# Patient Record
Sex: FEMALE | Race: WHITE | NOT HISPANIC OR LATINO | URBAN - METROPOLITAN AREA
[De-identification: names, ages, dates, MRNs, and addresses within clinical notes are randomized per-mention and may not be internally consistent; named-entity substitution may affect disease eponyms.]

---

## 2023-03-24 PROBLEM — S89.92XA LEFT KNEE INJURY: Status: ACTIVE | Noted: 2023-03-24

## 2023-03-24 PROBLEM — S83.232A: Status: ACTIVE | Noted: 2023-03-24

## 2023-03-24 PROBLEM — S83.207A TEAR OF MENISCUS OF LEFT KNEE: Status: ACTIVE | Noted: 2023-03-24

## 2023-03-24 RX ORDER — AMLODIPINE BESYLATE 10 MG/1
TABLET ORAL
COMMUNITY
Start: 2021-05-12

## 2023-03-24 RX ORDER — KETOCONAZOLE 20 MG/G
CREAM TOPICAL
COMMUNITY
Start: 2021-05-07

## 2023-03-24 RX ORDER — HYDROCORTISONE 25 MG/G
CREAM TOPICAL
COMMUNITY
Start: 2021-05-07

## 2023-03-24 RX ORDER — ROPINIROLE 1 MG/1
TABLET, FILM COATED ORAL
COMMUNITY
Start: 2020-10-23

## 2023-03-24 RX ORDER — HYDROCHLOROTHIAZIDE 12.5 MG/1
12.5 TABLET ORAL DAILY
COMMUNITY

## 2023-03-24 RX ORDER — ROPINIROLE 2 MG/1
TABLET, FILM COATED ORAL
COMMUNITY
Start: 2020-11-25

## 2023-03-29 ENCOUNTER — APPOINTMENT (OUTPATIENT)
Dept: PRIMARY CARE | Facility: CLINIC | Age: 83
End: 2023-03-29
Payer: MEDICARE

## 2024-10-28 ENCOUNTER — APPOINTMENT (OUTPATIENT)
Dept: ORTHOPEDIC SURGERY | Facility: CLINIC | Age: 84
End: 2024-10-28
Payer: MEDICARE

## 2024-10-28 ENCOUNTER — HOSPITAL ENCOUNTER (OUTPATIENT)
Dept: RADIOLOGY | Facility: CLINIC | Age: 84
Discharge: HOME | End: 2024-10-28
Payer: MEDICARE

## 2024-10-28 VITALS — WEIGHT: 172 LBS | HEIGHT: 65 IN | BODY MASS INDEX: 28.66 KG/M2

## 2024-10-28 DIAGNOSIS — M17.10 ARTHRITIS OF KNEE: Primary | ICD-10-CM

## 2024-10-28 DIAGNOSIS — M25.562 LEFT KNEE PAIN, UNSPECIFIED CHRONICITY: ICD-10-CM

## 2024-10-28 PROCEDURE — 73564 X-RAY EXAM KNEE 4 OR MORE: CPT | Mod: LEFT SIDE | Performed by: RADIOLOGY

## 2024-10-28 PROCEDURE — 73564 X-RAY EXAM KNEE 4 OR MORE: CPT | Mod: LT

## 2024-10-28 PROCEDURE — 20610 DRAIN/INJ JOINT/BURSA W/O US: CPT

## 2024-10-28 PROCEDURE — 1160F RVW MEDS BY RX/DR IN RCRD: CPT

## 2024-10-28 PROCEDURE — 1159F MED LIST DOCD IN RCRD: CPT

## 2024-10-28 PROCEDURE — 99214 OFFICE O/P EST MOD 30 MIN: CPT

## 2024-10-28 PROCEDURE — 1125F AMNT PAIN NOTED PAIN PRSNT: CPT

## 2024-10-28 RX ORDER — TRIAMCINOLONE ACETONIDE 40 MG/ML
40 INJECTION, SUSPENSION INTRA-ARTICULAR; INTRAMUSCULAR
Status: COMPLETED | OUTPATIENT
Start: 2024-10-28 | End: 2024-10-28

## 2024-10-28 RX ORDER — LIDOCAINE HYDROCHLORIDE 5 MG/ML
4 INJECTION, SOLUTION INFILTRATION; PERINEURAL
Status: COMPLETED | OUTPATIENT
Start: 2024-10-28 | End: 2024-10-28

## 2024-10-28 RX ORDER — MELOXICAM 15 MG/1
15 TABLET ORAL DAILY
Qty: 30 TABLET | Refills: 11 | Status: SHIPPED | OUTPATIENT
Start: 2024-10-28 | End: 2025-10-28

## 2024-10-28 ASSESSMENT — PAIN - FUNCTIONAL ASSESSMENT: PAIN_FUNCTIONAL_ASSESSMENT: 0-10

## 2024-10-28 ASSESSMENT — PAIN SCALES - GENERAL: PAINLEVEL_OUTOF10: 4

## 2025-06-02 ENCOUNTER — HOSPITAL ENCOUNTER (OUTPATIENT)
Dept: RADIOLOGY | Facility: CLINIC | Age: 85
Discharge: HOME | End: 2025-06-02
Payer: MEDICARE

## 2025-06-02 ENCOUNTER — APPOINTMENT (OUTPATIENT)
Facility: CLINIC | Age: 85
End: 2025-06-02
Payer: MEDICARE

## 2025-06-02 VITALS — WEIGHT: 172 LBS | BODY MASS INDEX: 28.66 KG/M2 | HEIGHT: 65 IN

## 2025-06-02 DIAGNOSIS — M17.10 ARTHRITIS OF KNEE: Primary | ICD-10-CM

## 2025-06-02 DIAGNOSIS — M25.562 LEFT KNEE PAIN, UNSPECIFIED CHRONICITY: ICD-10-CM

## 2025-06-02 PROCEDURE — 73564 X-RAY EXAM KNEE 4 OR MORE: CPT | Mod: LEFT SIDE | Performed by: RADIOLOGY

## 2025-06-02 PROCEDURE — 73564 X-RAY EXAM KNEE 4 OR MORE: CPT | Mod: LT

## 2025-06-02 RX ORDER — LIDOCAINE HYDROCHLORIDE 5 MG/ML
4 INJECTION, SOLUTION INFILTRATION; PERINEURAL
Status: COMPLETED | OUTPATIENT
Start: 2025-06-02 | End: 2025-06-02

## 2025-06-02 RX ORDER — TRIAMCINOLONE ACETONIDE 40 MG/ML
40 INJECTION, SUSPENSION INTRA-ARTICULAR; INTRAMUSCULAR
Status: COMPLETED | OUTPATIENT
Start: 2025-06-02 | End: 2025-06-02

## 2025-06-02 RX ADMIN — TRIAMCINOLONE ACETONIDE 40 MG: 40 INJECTION, SUSPENSION INTRA-ARTICULAR; INTRAMUSCULAR at 13:47

## 2025-06-02 RX ADMIN — LIDOCAINE HYDROCHLORIDE 4 ML: 5 INJECTION, SOLUTION INFILTRATION; PERINEURAL at 13:47

## 2025-06-02 ASSESSMENT — PAIN - FUNCTIONAL ASSESSMENT: PAIN_FUNCTIONAL_ASSESSMENT: 0-10

## 2025-06-02 ASSESSMENT — PAIN SCALES - GENERAL: PAINLEVEL_OUTOF10: 8

## 2025-06-02 NOTE — PATIENT INSTRUCTIONS
BMI was above normal measurement. Current weight: 78 kg (172 lb)  Weight change since last visit (-) denotes wt loss 0 lbs   Weight loss needed to achieve BMI 25: 22.1 Lbs  Weight loss needed to achieve BMI 30: -7.9 Lbs  Advised to Increase physical activity.

## 2025-06-02 NOTE — PROGRESS NOTES
History of Present Illness:     84 y.o. female presents to clinic today for a recheck of her left knee.  Patient has a history of a left knee scope with partial medial and lateral meniscectomy with microfracture and June 2021 by Dr. Hackett.  She was last seen on 10/28/24 with myself. She was having left knee pain for approximately 8 months. She had a cortisone injection at that time that helped for about 4 months. Today she states she is having quite a bit of pain.  She states going up and down stairs at his house really aggravated her left knee.  She notes that she has predominantly anterior medial knee pain.  She states it is worse with activity and is better with rest. She is taking tylenol for pain..  She has also used topical Voltaren gel in the past with minimal relief.  She does some home exercise program without significant improvement.  She denies any radicular symptoms.  No other surgeries.        Review of Systems:     GENERAL: Negative  GI: Negative  MUSCULOSKELETAL: See HPI  SKIN: Negative  NEURO:  Negative           Physical Exam:  Knee:  Skin is intact. No erythema or drainage noted. No evidence of infection today. No swelling noted.  Extensor mechanism intact.  Range of motion of left knee is 0-120 degrees of flection. Positive patellar grind test. Tenderness to palpation to medial joint line. Mild tenderness to lateral joint line. Patient has a negative Lachman exam, negative anterior and negative posterior drawer. The knee is stable to varus and valgus stress without pain. Negative patellar apprehension. Patient is neurovascularly intact in the bilateral lower extremities.        Imaging:     X-rays of the patient were ordered and obtained by Mónica Nash PA-C today.  The patient's x-ray images were personally viewed by Mónica Nash PA-C and the following findings are her personal interpretation: left knee  The left knee demonstrate moderate narrowing of the medial compartment.  There is  patellofemoral joint space narrowing noted.  No acute fractures or dislocations seen on x-rays today.     Assessment:    left knee pain related to arthritis. History of a left knee scope with partial medial meniscectomy and lateral meniscectomy from 2021.      Plan:  We talked about the arthritis in the patient's knee.  Nonoperative and operative treatments for knee arthritis include weight loss, physical therapy, anti-inflammatory medications, steroid injections, viscosupplementation injections, bracing, walking aids such as a cane, and partial or total knee replacements.  Knee arthritis is a progressive disease and while we cannot reverse the stages of arthritis, we hope to make the patient more comfortable.  I answered all of the patients questions in relation to their knee and arthritis in general.     At this time the patient is elected to move forward with conservative treatment options.  She can continue with meloxicam and tylenol as needed for pain. Additionally we discussed the benefits of a cortisone injection into the left knee.  I explained to the patient that there are some rare risks of steroid injections.       Rare risks of steroid injections into the knee include pain at the site of the injection, local swelling, irritation from the injection or the skin spray, local discoloration of the skin, risk of bleeding, and a risk of knee infection.  If the patient does have a flareup of pain in the evening following the injection, they should ice the knee 15 minutes at a time 3 times a day for up to 3 days.  If the pain gets too severe, they should go to a local Emergency Room right away.       After understanding that there are risks and benefits of steroid injections, the patient decided to proceed with injection.  The knee was prepped sterilely with betadyne and 5 mL of 0.25% Marcaine and 1 mL of Kenalog 40 mg was injected into the knee without complications.  A bandage was applied at the site of the  injection.  The patient tolerated the procedure well.     Will follow-up with the patient as needed regarding this issue.    Patient ID: Jessica Novoa is a 84 y.o. female.    L Inj/Asp: L knee on 6/2/2025 1:47 PM  Indications: pain  Details: 22 G needle, anterolateral approach  Medications: 40 mg triamcinolone acetonide 40 mg/mL; 4 mL lidocaine 5 mg/mL (0.5 %)  Outcome: tolerated well, no immediate complications  Procedure, treatment alternatives, risks and benefits explained, specific risks discussed. Consent was given by the patient. Immediately prior to procedure a time out was called to verify the correct patient, procedure, equipment, support staff and site/side marked as required. Patient was prepped and draped in the usual sterile fashion.